# Patient Record
Sex: FEMALE | Race: WHITE | NOT HISPANIC OR LATINO | Employment: STUDENT | URBAN - METROPOLITAN AREA
[De-identification: names, ages, dates, MRNs, and addresses within clinical notes are randomized per-mention and may not be internally consistent; named-entity substitution may affect disease eponyms.]

---

## 2018-06-22 ENCOUNTER — ACNE/ROSACEA (OUTPATIENT)
Dept: URBAN - METROPOLITAN AREA CLINIC 31 | Facility: CLINIC | Age: 28
Setting detail: DERMATOLOGY
End: 2018-06-22

## 2018-06-22 ENCOUNTER — RX ONLY (RX ONLY)
Age: 28
End: 2018-06-22

## 2018-06-22 PROBLEM — L71.8 OTHER ROSACEA: Status: ACTIVE | Noted: 2018-06-22

## 2018-06-22 PROBLEM — L71.8 OTHER ROSACEA: Status: RESOLVED | Noted: 2018-06-22

## 2018-06-22 PROCEDURE — 99202 OFFICE O/P NEW SF 15 MIN: CPT

## 2018-06-22 RX ORDER — TRETINOIN 0.8 MG/G
1 GM GEL TOPICAL NIGHTLY
Qty: 50 | Refills: 4 | Status: DISCONTINUED
Start: 2018-06-22 | End: 2018-09-07

## 2018-06-22 RX ORDER — TRETINOIN 0.6 MG/G
1 GM GEL TOPICAL NIGHTLY
Qty: 50 | Refills: 3
Start: 2018-06-22

## 2018-06-22 RX ORDER — OXYMETAZOLINE HYDROCHLORIDE 1 G/100G
1 APPLICATION CREAM TOPICAL DAILY
Qty: 30 | Refills: 4
Start: 2018-06-22

## 2018-09-07 ENCOUNTER — RX ONLY (RX ONLY)
Age: 28
End: 2018-09-07

## 2018-09-07 ENCOUNTER — ACNE/ROSACEA (OUTPATIENT)
Dept: URBAN - METROPOLITAN AREA CLINIC 31 | Facility: CLINIC | Age: 28
Setting detail: DERMATOLOGY
End: 2018-09-07

## 2018-09-07 PROCEDURE — 99213 OFFICE O/P EST LOW 20 MIN: CPT

## 2018-09-21 ENCOUNTER — RX ONLY (RX ONLY)
Age: 28
End: 2018-09-21

## 2018-09-21 ENCOUNTER — ACNE/ROSACEA (OUTPATIENT)
Dept: URBAN - METROPOLITAN AREA CLINIC 31 | Facility: CLINIC | Age: 28
Setting detail: DERMATOLOGY
End: 2018-09-21

## 2019-01-02 ENCOUNTER — VBEAM/KTP-BH (OUTPATIENT)
Dept: URBAN - METROPOLITAN AREA CLINIC 31 | Facility: CLINIC | Age: 29
Setting detail: DERMATOLOGY
End: 2019-01-02

## 2019-01-02 PROCEDURE — VBEAM VBEAM LASER TREATMENT: HCPCS

## 2019-01-30 ENCOUNTER — VBEAM/KTP-BH (OUTPATIENT)
Dept: URBAN - METROPOLITAN AREA CLINIC 31 | Facility: CLINIC | Age: 29
Setting detail: DERMATOLOGY
End: 2019-01-30

## 2019-01-30 DIAGNOSIS — D48.5 NEOPLASM OF UNCERTAIN BEHAVIOR OF SKIN: ICD-10-CM

## 2019-01-30 PROCEDURE — VBEAM VBEAM LASER TREATMENT: HCPCS

## 2019-02-11 ENCOUNTER — RX ONLY (RX ONLY)
Age: 29
End: 2019-02-11

## 2019-02-11 RX ORDER — DOXYCYCLINE HYCLATE 150 MG/1
1 TABLET TABLET, DELAYED RELEASE ORAL DAILY
Qty: 30 | Refills: 2
Start: 2019-02-11

## 2019-03-13 ENCOUNTER — VBEAM/KTP-BH (OUTPATIENT)
Dept: URBAN - METROPOLITAN AREA CLINIC 31 | Facility: CLINIC | Age: 29
Setting detail: DERMATOLOGY
End: 2019-03-13

## 2019-03-13 DIAGNOSIS — D22.9 MELANOCYTIC NEVI, UNSPECIFIED: ICD-10-CM

## 2019-03-13 DIAGNOSIS — L82.1 OTHER SEBORRHEIC KERATOSIS: ICD-10-CM

## 2019-03-13 PROCEDURE — VBEAM VBEAM LASER TREATMENT: HCPCS

## 2019-06-17 ENCOUNTER — SEE NOTE (OUTPATIENT)
Dept: URBAN - METROPOLITAN AREA CLINIC 32 | Facility: CLINIC | Age: 29
Setting detail: DERMATOLOGY
End: 2019-06-17

## 2019-06-17 DIAGNOSIS — L70.0 ACNE VULGARIS: ICD-10-CM

## 2019-06-17 PROCEDURE — 99213 OFFICE O/P EST LOW 20 MIN: CPT

## 2019-06-17 PROCEDURE — 10061 I&D ABSCESS COMP/MULTIPLE: CPT

## 2019-06-24 ENCOUNTER — SKIN CANCER EXAM (OUTPATIENT)
Dept: URBAN - METROPOLITAN AREA CLINIC 32 | Facility: CLINIC | Age: 29
Setting detail: DERMATOLOGY
End: 2019-06-24

## 2019-06-24 DIAGNOSIS — L72.3 SEBACEOUS CYST: ICD-10-CM

## 2019-06-24 PROCEDURE — 99214 OFFICE O/P EST MOD 30 MIN: CPT

## 2021-04-26 ENCOUNTER — SKIN CANCER EXAM (OUTPATIENT)
Dept: URBAN - METROPOLITAN AREA CLINIC 32 | Facility: CLINIC | Age: 31
Setting detail: DERMATOLOGY
End: 2021-04-26

## 2021-04-26 ENCOUNTER — RX ONLY (RX ONLY)
Age: 31
End: 2021-04-26

## 2021-04-26 DIAGNOSIS — Z41.9 ENCOUNTER FOR PROCEDURE FOR PURPOSES OTHER THAN REMEDYING HEALTH STATE, UNSPECIFIED: ICD-10-CM

## 2021-04-26 PROBLEM — L85.3 XEROSIS CUTIS: Status: ACTIVE | Noted: 2021-04-26

## 2021-04-26 PROBLEM — L85.3 XEROSIS CUTIS: Status: RESOLVED | Noted: 2021-04-26

## 2021-04-26 PROCEDURE — 99214 OFFICE O/P EST MOD 30 MIN: CPT

## 2022-01-18 ENCOUNTER — SEE NOTE (OUTPATIENT)
Dept: URBAN - METROPOLITAN AREA CLINIC 32 | Facility: CLINIC | Age: 32
Setting detail: DERMATOLOGY
End: 2022-01-18

## 2022-01-18 ENCOUNTER — RX ONLY (RX ONLY)
Age: 32
End: 2022-01-18

## 2022-01-18 DIAGNOSIS — D48.5 NEOPLASM OF UNCERTAIN BEHAVIOR OF SKIN: ICD-10-CM

## 2022-01-18 PROCEDURE — 99213 OFFICE O/P EST LOW 20 MIN: CPT

## 2022-01-18 RX ORDER — CLOTRIMAZOLE AND BETAMETHASONE DIPROPIONATE 10; .5 MG/G; MG/G
1 LIBERALLY CREAM TOPICAL TWICE A DAY
Qty: 45 | Refills: 0
Start: 2022-01-18

## 2022-10-25 ENCOUNTER — CLAIMS CREATED FROM THE CLAIM WINDOW (OUTPATIENT)
Dept: URBAN - METROPOLITAN AREA CLINIC 96 | Facility: CLINIC | Age: 32
End: 2022-10-25
Payer: COMMERCIAL

## 2022-10-25 ENCOUNTER — WEB ENCOUNTER (OUTPATIENT)
Dept: URBAN - METROPOLITAN AREA CLINIC 96 | Facility: CLINIC | Age: 32
End: 2022-10-25

## 2022-10-25 ENCOUNTER — DASHBOARD ENCOUNTERS (OUTPATIENT)
Age: 32
End: 2022-10-25

## 2022-10-25 VITALS
HEIGHT: 72 IN | HEART RATE: 111 BPM | BODY MASS INDEX: 39.68 KG/M2 | OXYGEN SATURATION: 98 % | DIASTOLIC BLOOD PRESSURE: 105 MMHG | SYSTOLIC BLOOD PRESSURE: 144 MMHG | WEIGHT: 293 LBS | TEMPERATURE: 97.6 F | RESPIRATION RATE: 18 BRPM

## 2022-10-25 DIAGNOSIS — E66.01 MORBID (SEVERE) OBESITY DUE TO EXCESS CALORIES: ICD-10-CM

## 2022-10-25 DIAGNOSIS — K62.5 RECTAL BLEEDING: ICD-10-CM

## 2022-10-25 DIAGNOSIS — Z83.71 FAMILY HISTORY OF COLONIC POLYPS: ICD-10-CM

## 2022-10-25 PROBLEM — 408512008: Status: ACTIVE | Noted: 2022-10-25

## 2022-10-25 PROBLEM — 83911000119104: Status: ACTIVE | Noted: 2022-10-25

## 2022-10-25 PROCEDURE — 99244 OFF/OP CNSLTJ NEW/EST MOD 40: CPT | Performed by: INTERNAL MEDICINE

## 2022-10-25 PROCEDURE — 99204 OFFICE O/P NEW MOD 45 MIN: CPT | Performed by: INTERNAL MEDICINE

## 2022-10-25 RX ORDER — SODIUM PICOSULFATE, MAGNESIUM OXIDE, AND ANHYDROUS CITRIC ACID 10; 3.5; 12 MG/160ML; G/160ML; G/160ML
160 ML LIQUID ORAL AS DIRECTED
Qty: 1 | Refills: 0 | OUTPATIENT
Start: 2022-10-25 | End: 2022-10-26

## 2022-10-25 NOTE — HPI-TODAY'S VISIT:
Patient being seen in consultation as requested by Dr. Stephanie Erwin for rectal bleeding.  A copy of this document will be sent to the requesting physician.  33 yo female reports over 6 months ago was having diarrhea. Stool testing negative for infection with primary MD. Treated with antidiarrheal which did help, diarrhea resolved after a week. 2 weeks ago had onset or BRBPR on toilet paper, no rectal pain. Denies any constipation, does report straining with BM. Yesterday still had BRBPR on toilet tissues.   BM formed, no blood mixed in stool. No abdominal pain, no n/v, no f/c, no unintentional weight loss. No chronic NSAIDs. No family hx of IBD. Brother, mother, father with colon polyps. MGM with colon cancer.  No prior colonoscopy. No hx of anesthesia problems, no cardiac issues, no renal issues. No hx of FORTUNATO.

## 2022-10-25 NOTE — PHYSICAL EXAM GASTROINTESTINAL
Abdomen , obese, striae, soft, nontender, nondistended , no guarding or rigidity , no masses palpable , normal bowel sounds , Liver and Spleen,  no hepatosplenomegaly , liver nontender

## 2022-12-13 ENCOUNTER — OFFICE VISIT (OUTPATIENT)
Dept: URBAN - METROPOLITAN AREA MEDICAL CENTER 28 | Facility: MEDICAL CENTER | Age: 32
End: 2022-12-13